# Patient Record
Sex: MALE | URBAN - METROPOLITAN AREA
[De-identification: names, ages, dates, MRNs, and addresses within clinical notes are randomized per-mention and may not be internally consistent; named-entity substitution may affect disease eponyms.]

---

## 2019-09-19 ENCOUNTER — APPOINTMENT (RX ONLY)
Dept: URBAN - METROPOLITAN AREA CLINIC 81 | Facility: CLINIC | Age: 54
Setting detail: DERMATOLOGY
End: 2019-09-19

## 2019-09-19 DIAGNOSIS — L82.1 OTHER SEBORRHEIC KERATOSIS: ICD-10-CM

## 2019-09-19 DIAGNOSIS — D18.0 HEMANGIOMA: ICD-10-CM

## 2019-09-19 DIAGNOSIS — L81.4 OTHER MELANIN HYPERPIGMENTATION: ICD-10-CM

## 2019-09-19 DIAGNOSIS — D22 MELANOCYTIC NEVI: ICD-10-CM

## 2019-09-19 PROBLEM — D18.01 HEMANGIOMA OF SKIN AND SUBCUTANEOUS TISSUE: Status: ACTIVE | Noted: 2019-09-19

## 2019-09-19 PROBLEM — D22.5 MELANOCYTIC NEVI OF TRUNK: Status: ACTIVE | Noted: 2019-09-19

## 2019-09-19 PROCEDURE — ? COUNSELING

## 2019-09-19 PROCEDURE — ? INVENTORY

## 2019-09-19 PROCEDURE — 99203 OFFICE O/P NEW LOW 30 MIN: CPT

## 2019-09-19 ASSESSMENT — LOCATION SIMPLE DESCRIPTION DERM
LOCATION SIMPLE: RIGHT UPPER BACK
LOCATION SIMPLE: ABDOMEN
LOCATION SIMPLE: UPPER BACK

## 2019-09-19 ASSESSMENT — LOCATION DETAILED DESCRIPTION DERM
LOCATION DETAILED: PERIUMBILICAL SKIN
LOCATION DETAILED: RIGHT INFERIOR MEDIAL UPPER BACK
LOCATION DETAILED: INFERIOR THORACIC SPINE

## 2019-09-19 ASSESSMENT — LOCATION ZONE DERM: LOCATION ZONE: TRUNK

## 2019-09-19 NOTE — PROCEDURE: MIPS QUALITY
Quality 402: Tobacco Use And Help With Quitting Among Adolescents: Patient screened for tobacco and never smoked
Quality 431: Preventive Care And Screening: Unhealthy Alcohol Use - Screening: Patient screened for unhealthy alcohol use using a single question and scores less than 2 times per year
Quality 110: Preventive Care And Screening: Influenza Immunization: Influenza Immunization not Administered because Patient Refused.
Quality 130: Documentation Of Current Medications In The Medical Record: Current Medications Documented
Detail Level: Detailed